# Patient Record
Sex: FEMALE | Race: WHITE | NOT HISPANIC OR LATINO | Employment: FULL TIME | URBAN - METROPOLITAN AREA
[De-identification: names, ages, dates, MRNs, and addresses within clinical notes are randomized per-mention and may not be internally consistent; named-entity substitution may affect disease eponyms.]

---

## 2023-02-20 ENCOUNTER — HOSPITAL ENCOUNTER (EMERGENCY)
Facility: CLINIC | Age: 68
Discharge: HOME | End: 2023-02-20
Attending: FAMILY MEDICINE | Admitting: FAMILY MEDICINE
Payer: COMMERCIAL

## 2023-02-20 ENCOUNTER — APPOINTMENT (OUTPATIENT)
Dept: GENERAL RADIOLOGY | Facility: CLINIC | Age: 68
End: 2023-02-20
Attending: EMERGENCY MEDICINE
Payer: COMMERCIAL

## 2023-02-20 ENCOUNTER — APPOINTMENT (OUTPATIENT)
Dept: GENERAL RADIOLOGY | Facility: CLINIC | Age: 68
End: 2023-02-20
Attending: FAMILY MEDICINE
Payer: COMMERCIAL

## 2023-02-20 VITALS
RESPIRATION RATE: 15 BRPM | OXYGEN SATURATION: 100 % | SYSTOLIC BLOOD PRESSURE: 163 MMHG | WEIGHT: 165 LBS | DIASTOLIC BLOOD PRESSURE: 86 MMHG | HEART RATE: 75 BPM | TEMPERATURE: 97 F

## 2023-02-20 DIAGNOSIS — S43.005A SHOULDER DISLOCATION, LEFT, INITIAL ENCOUNTER: ICD-10-CM

## 2023-02-20 PROCEDURE — 99152 MOD SED SAME PHYS/QHP 5/>YRS: CPT | Mod: LT | Performed by: FAMILY MEDICINE

## 2023-02-20 PROCEDURE — 999N000157 HC STATISTIC RCP TIME EA 10 MIN

## 2023-02-20 PROCEDURE — 23655 CLTX SHO DSLC W/MNPJ W/ANES: CPT | Mod: LT | Performed by: FAMILY MEDICINE

## 2023-02-20 PROCEDURE — 73060 X-RAY EXAM OF HUMERUS: CPT | Mod: LT

## 2023-02-20 PROCEDURE — 250N000011 HC RX IP 250 OP 636: Performed by: FAMILY MEDICINE

## 2023-02-20 PROCEDURE — 29105 APPLICATION LONG ARM SPLINT: CPT | Mod: 59 | Performed by: FAMILY MEDICINE

## 2023-02-20 PROCEDURE — 99152 MOD SED SAME PHYS/QHP 5/>YRS: CPT | Performed by: FAMILY MEDICINE

## 2023-02-20 PROCEDURE — 999N000065 XR SHOULDER LEFT G/E 3 VIEWS: Mod: LT

## 2023-02-20 PROCEDURE — 99285 EMERGENCY DEPT VISIT HI MDM: CPT | Mod: 25 | Performed by: FAMILY MEDICINE

## 2023-02-20 PROCEDURE — 96374 THER/PROPH/DIAG INJ IV PUSH: CPT | Performed by: FAMILY MEDICINE

## 2023-02-20 PROCEDURE — 29105 APPLICATION LONG ARM SPLINT: CPT | Mod: 59,LT | Performed by: FAMILY MEDICINE

## 2023-02-20 PROCEDURE — 23655 CLTX SHO DSLC W/MNPJ W/ANES: CPT | Performed by: FAMILY MEDICINE

## 2023-02-20 RX ORDER — PROPOFOL 10 MG/ML
1 INJECTION, EMULSION INTRAVENOUS ONCE
Status: COMPLETED | OUTPATIENT
Start: 2023-02-20 | End: 2023-02-20

## 2023-02-20 RX ORDER — OXYCODONE HYDROCHLORIDE 5 MG/1
5 TABLET ORAL EVERY 4 HOURS PRN
Qty: 12 TABLET | Refills: 0 | Status: SHIPPED | OUTPATIENT
Start: 2023-02-20 | End: 2023-02-23

## 2023-02-20 RX ORDER — FLUMAZENIL 0.1 MG/ML
0.2 INJECTION, SOLUTION INTRAVENOUS
Status: DISCONTINUED | OUTPATIENT
Start: 2023-02-20 | End: 2023-02-20 | Stop reason: HOSPADM

## 2023-02-20 RX ADMIN — PROPOFOL 75 MG: 10 INJECTION, EMULSION INTRAVENOUS at 17:57

## 2023-02-20 ASSESSMENT — ACTIVITIES OF DAILY LIVING (ADL): ADLS_ACUITY_SCORE: 35

## 2023-02-20 NOTE — ED PROVIDER NOTES
History     Chief Complaint   Patient presents with     Arm Pain     HPI  Na Damon is a 67 year old female who presents to the ED this afternoon with left shoulder pain.  She slipped and fell on the ice and injured her left shoulder.  Denies any other injuries.  Did not strike her head.  Hurts to move the arm and denies any obvious deformity.  No distal numbness.  Has not taken anything for the pain.  Last oral intake was about 6-1/2 hours ago.  Has tolerated sedation in the past without difficulty.      Allergies:  No Known Allergies    Problem List:    There are no problems to display for this patient.       Past Medical History:    No past medical history on file.    Past Surgical History:    Past Surgical History:   Procedure Laterality Date     COLONOSCOPY  06/25/08    Polyps.        Family History:    No family history on file.    Social History:  Marital Status:   [2]        Medications:    oxyCODONE (ROXICODONE) 5 MG tablet  aspirin 81 MG tablet  multivitamin, therapeutic with minerals (MULTI-VITAMIN) TABS          Review of Systems   All other systems reviewed and are negative.      Physical Exam   BP: (!) 143/98  Pulse: 77  Temp: 97  F (36.1  C)  Resp: 18  Weight: 74.8 kg (165 lb)  SpO2: 100 %      Physical Exam  Constitutional:       General: She is in acute distress (moderate).      Appearance: Normal appearance.   HENT:      Head: Normocephalic and atraumatic.      Mouth/Throat:      Mouth: Mucous membranes are moist.      Pharynx: Oropharynx is clear.   Eyes:      Extraocular Movements: Extraocular movements intact.   Cardiovascular:      Rate and Rhythm: Normal rate and regular rhythm.   Pulmonary:      Effort: Pulmonary effort is normal.      Breath sounds: Normal breath sounds.   Musculoskeletal:      Left shoulder: Deformity and tenderness present. Decreased range of motion.      Left elbow: Normal.      Left wrist: Normal.   Neurological:      Mental Status: She is alert.          ED Course                 [facility]    -Dislocation - Upper Extremity    Date/Time: 2/20/2023 7:20 PM  Performed by: Jake Rodriguez MD  Authorized by: Jake Rodriguez MD     Risks, benefits and alternatives discussed.    ED EVALUATION:      I have performed an Emergency Department Evaluation including taking a history and physical examination, this evaluation will be documented in the electronic medical record for this ED encounter.      ASA Class: Class 1- healthy patient    Mallampati: Grade 3- soft palate visible, posterior pharyngeal wall not visible    UNIVERSAL PROTOCOL   Site Marked: NA  Prior Images Obtained and Reviewed:  Yes  Required items: Required blood products, implants, devices and special equipment available    Patient identity confirmed:  Verbally with patient, arm band and provided demographic data  Patient was reevaluated immediately before administering moderate or deep sedation or anesthesia  Confirmation Checklist:  Patient's identity using two indicators, relevant allergies and procedure was appropriate and matched the consent or emergent situation  Time out: Immediately prior to the procedure a time out was called    Universal Protocol: the Joint Commission Universal Protocol was followed    Preparation: Patient was prepped and draped in usual sterile fashion      LOCATION     Location:  Shoulder    Shoulder location:  L shoulder    Shoulder dislocation type: anterior      Chronicity:  New    PRE PROCEDURE ASSESSMENT      Pre-procedure imaging:  X-ray    Imaging findings: dislocation present      Fracture of greater humeral tuberosity: no      Hill-Sachs deformity: no      Distal perfusion: normal      SEDATION  Patient Sedated: Yes    Sedation:  Propofol and see MAR for details  Vital signs: Vital signs monitored during sedation      ANESTHESIA (see MAR for exact dosages)      Anesthesia method:  None    PROCEDURE DETAILS       Manipulation performed: yes      Shoulder reduction method:  Direct traction    Reduction successful: yes      Reduction confirmed with imaging: yes      Immobilization: Shoulder immobilizer.    POST PROCEDURE DETAILS     Neurological function: normal      Distal perfusion: normal      Range of motion: improved        PROCEDURE    Patient Tolerance:  Patient tolerated the procedure well with no immediate complications  Length of time physician/provider present for 1:1 monitoring during sedation: 10                Critical Care time:  none               Results for orders placed or performed during the hospital encounter of 02/20/23 (from the past 24 hour(s))   Humerus XR,  G/E 2 views, left    Narrative    XR HUMERUS LEFT G/E 2 VIEWS 2/20/2023 3:45 PM     HISTORY: fall; pain    COMPARISON: None.       Impression    IMPRESSION: Anterior dislocation of the left glenohumeral joint. No  evidence for fracture but repeat films following reduction  recommended.    KATHIA SLOAN MD         SYSTEM ID:  [id number]   XR Shoulder Left G/E 3 Views    Narrative    EXAM: XR SHOULDER LEFT G/E 3 VIEWS  LOCATION: [facility]  DATE/TIME: 2/20/2023 6:17 PM    INDICATION: post reduction  COMPARISON: 02/20/2023      Impression    IMPRESSION: Interval reduction of previous glenohumeral joint dislocation with normal alignment. No acute fracture.       Medications   flumazenil (ROMAZICON) injection 0.2 mg (has no administration in time range)   propofol (DIPRIVAN) injection 10 mg/mL vial (75 mg Intravenous Given 2/20/23 0418)       Assessments & Plan (with Medical Decision Making)  67-year-old female slipped and fell on the ice and injured her left shoulder.  X-ray shows an anterior dislocation.  No other injuries.   she was sedated with propofol and the shoulder was easily reduced with traction.  She was placed in a shoulder immobilizer.  Postreduction x-ray shows normal anatomical alignment and no  fractures.  Oxycodone for pain.  Ice liberally.  Recheck in orthopedic clinic later this week or next.  We discussed reportable signs when to return.  Verbal and written discharge instructions given.  She and her  are comfortable with this plan.     I have reviewed the nursing notes.    I have reviewed the findings, diagnosis, plan and need for follow up with the patient.           Medical Decision Making  The patient's presentation was of moderate complexity (an acute complicated injury).    The patient's evaluation involved:  ordering and/or review of 1 test(s) in this encounter (see separate area of note for details)    The patient's management necessitated moderate risk (prescription drug management including medications given in the ED) and high risk (a decision regarding emergency major procedure (dislocation reduction)).        New Prescriptions    OXYCODONE (ROXICODONE) 5 MG TABLET    Take 1 tablet (5 mg) by mouth every 4 hours as needed for pain       Final diagnoses:   Shoulder dislocation, left, initial encounter       2/20/2023   [facility] EMERGENCY DEPT     Jake Rodriguez MD  02/20/23 1921

## 2023-02-21 NOTE — DISCHARGE INSTRUCTIONS
Ice 20 minutes per hour, (20 minutes on, 40 minutes off) at least 4 times a day.  Ibuprofen 600 mg and Tylenol 1000 mg every 6 hours as needed for pain.  You can take them at the same time.  Take with food so the Ibuprofen doesn't upset your stomach.  You can use the oxycodone as needed for more severe pain.  Wear the shoulder immobilizer until you are seen in the clinic for reevaluation.  It was a pleasure visiting with both of you tonight.  I hope this heals up quickly for you.    Thank you for choosing [facility]. We appreciate the opportunity to meet your urgent medical needs. Please let us know if we could have done anything to make your stay more satisfying.    After discharge, please closely monitor for any new or worsening symptoms. Return to the Emergency Department if you develop any acute worsening signs or symptoms.    If you had lab work, cultures or imaging studies done during your stay, the final results may still be pending. We will call you if your plan of care needs to change. However, if you are not improving as expected, please follow up with your primary care provider or clinic.     Start any prescription medications that were prescribed to you and take them as directed.     Please see additional handouts that may be pertinent to your condition.

## 2023-02-21 NOTE — TELEPHONE ENCOUNTER
DIAGNOSIS: Shoulder dislocation, left    APPOINTMENT DATE: 02/23/2023   NOTES STATUS DETAILS   OFFICE NOTE from referring provider N/A    OFFICE NOTE from other specialist N/A    DISCHARGE SUMMARY from hospital N/A    DISCHARGE REPORT from the ER Internal 02/20/2023 [facility] ED    OPERATIVE REPORT N/A    EMG report N/A    MEDICATION LIST N/A    MRI N/A    DEXA (osteoporosis/bone health) N/A    CT SCAN N/A    XRAYS (IMAGES & REPORTS) Internal 02/20/2023 [facility] shoulder, humerus

## 2023-02-23 ENCOUNTER — PRE VISIT (OUTPATIENT)
Dept: ORTHOPEDICS | Facility: CLINIC | Age: 68
End: 2023-02-23

## 2023-02-24 ENCOUNTER — OFFICE VISIT (OUTPATIENT)
Dept: ORTHOPEDICS | Facility: CLINIC | Age: 68
End: 2023-02-24
Attending: FAMILY MEDICINE
Payer: COMMERCIAL

## 2023-02-24 DIAGNOSIS — S43.005A SHOULDER DISLOCATION, LEFT, INITIAL ENCOUNTER: ICD-10-CM

## 2023-02-24 PROCEDURE — 99203 OFFICE O/P NEW LOW 30 MIN: CPT | Performed by: FAMILY MEDICINE

## 2023-02-24 ASSESSMENT — PAIN SCALES - GENERAL: PAINLEVEL: NO PAIN (0)

## 2023-02-24 NOTE — LETTER
2/24/2023         RE: Na Damon  [address] [facility] [address]        Dear Colleague,    Thank you for referring your patient, Na Damon, to the [facility] [place]. Please see a copy of my visit note below.    CHIEF COMPLAINT:  Pain and New Patient of the Left Shoulder       HISTORY OF PRESENT ILLNESS  Ms. Damon is a pleasant 67 year old female who presents to clinic today after a left shoulder dislocation.  Na suffered a fall 4 days ago, after he slipped on the ice.  She felt her shoulder dislocate and was seen at the emergency department shortly after.  She did have a successful relocation in the emergency department.  This is her nondominant arm, this is her first time dislocating her shoulder.  She denies any pain at the moment, no numbness or tingling, no strength issues.        Additional history: as documented    MEDICAL HISTORY  There is no problem list on file for this patient.      Current Outpatient Medications   Medication Sig Dispense Refill     aspirin 81 MG tablet Take 81 mg by mouth daily.       multivitamin w/minerals (THERA-VIT-M) tablet Take 1 tablet by mouth daily.         No Known Allergies    No family history on file.    Additional medical/Social/Surgical histories reviewed in [facility] and updated as appropriate.        PHYSICAL EXAM  General  - normal appearance, in no obvious distress  Musculoskeletal - left shoulder  - inspection: normal bone and joint alignment, no obvious deformity, no scapular winging, no AC step-off  - palpation: no tenderness, normal clavicle, non-tender AC  - ROM: not painful to 30 deg in all planes  - strength: 5/5  strength, 5/5 in all shoulder planes  Neuro  - no sensory or motor deficit, grossly normal coordination, normal muscle tone                 ASSESSMENT & PLAN  Ms. Damon is a 67 year old female who presents to clinic today after a left shoulder dislocation.    I reviewed her x-ray  in the room with her and her , this shows pre and post reduction films with no arthritis and no fracture.    We discussed pathophysiology and treatment strategies for his shoulder dislocation and aftercare, we also discussed recurrence rates and the fact that they do decline in accordance with age.    I am referring her to physical therapy, we also gave her a sling today to use in place of her shoulder immobilizer.  She should follow-up in 3 weeks, if she is doing well at that visit we can consider removing restrictions.    It was a pleasure seeing Na today.    Chet Plaza DO, [facility]  Primary Care Sports Medicine      This note was constructed using Dragon dictation software, please excuse any minor errors in spelling, grammar, or syntax.        Again, thank you for allowing me to participate in the care of your patient.        Sincerely,        Chet Plaza DO

## 2023-02-24 NOTE — PROGRESS NOTES
CHIEF COMPLAINT:  Pain and New Patient of the Left Shoulder       HISTORY OF PRESENT ILLNESS  Ms. Damon is a pleasant 67 year old female who presents to clinic today after a left shoulder dislocation.  Na suffered a fall 4 days ago, after he slipped on the ice.  She felt her shoulder dislocate and was seen at the emergency department shortly after.  She did have a successful relocation in the emergency department.  This is her nondominant arm, this is her first time dislocating her shoulder.  She denies any pain at the moment, no numbness or tingling, no strength issues.        Additional history: as documented    MEDICAL HISTORY  There is no problem list on file for this patient.      Current Outpatient Medications   Medication Sig Dispense Refill     aspirin 81 MG tablet Take 81 mg by mouth daily.       multivitamin w/minerals (THERA-VIT-M) tablet Take 1 tablet by mouth daily.         No Known Allergies    No family history on file.    Additional medical/Social/Surgical histories reviewed in [facility] and updated as appropriate.        PHYSICAL EXAM  General  - normal appearance, in no obvious distress  Musculoskeletal - left shoulder  - inspection: normal bone and joint alignment, no obvious deformity, no scapular winging, no AC step-off  - palpation: no tenderness, normal clavicle, non-tender AC  - ROM: not painful to 30 deg in all planes  - strength: 5/5  strength, 5/5 in all shoulder planes  Neuro  - no sensory or motor deficit, grossly normal coordination, normal muscle tone                 ASSESSMENT & PLAN  Ms. Damon is a 67 year old female who presents to clinic today after a left shoulder dislocation.    I reviewed her x-ray in the room with her and her , this shows pre and post reduction films with no arthritis and no fracture.    We discussed pathophysiology and treatment strategies for his shoulder dislocation and aftercare, we also discussed recurrence rates and the fact that they do  decline in accordance with age.    I am referring her to physical therapy, we also gave her a sling today to use in place of her shoulder immobilizer.  She should follow-up in 3 weeks, if she is doing well at that visit we can consider removing restrictions.    It was a pleasure seeing Na today.    Chet Plaza DO, [facility]  Primary Care Sports Medicine      This note was constructed using Dragon dictation software, please excuse any minor errors in spelling, grammar, or syntax.

## 2023-02-24 NOTE — NURSING NOTE
Chief Complaint   Patient presents with     Left Shoulder - Pain, New Patient       There were no vitals filed for this visit.    There is no height or weight on file to calculate BMI.      GAYLE Carvalho NREMT

## 2023-02-28 ENCOUNTER — THERAPY VISIT (OUTPATIENT)
Dept: PHYSICAL THERAPY | Facility: CLINIC | Age: 68
End: 2023-02-28
Payer: COMMERCIAL

## 2023-02-28 DIAGNOSIS — S43.005A SHOULDER DISLOCATION, LEFT, INITIAL ENCOUNTER: ICD-10-CM

## 2023-02-28 PROCEDURE — 97161 PT EVAL LOW COMPLEX 20 MIN: CPT | Mod: GP | Performed by: PHYSICAL THERAPIST

## 2023-02-28 PROCEDURE — 97110 THERAPEUTIC EXERCISES: CPT | Mod: GP | Performed by: PHYSICAL THERAPIST

## 2023-02-28 NOTE — PROGRESS NOTES
Physical Therapy Initial Evaluation  Subjective:  The history is provided by the patient. No  was used.   Patient Health History  Na Damon being seen for L shoulder dislocation.     Problem began: 2/20/2023.   Problem occurred: Fall   Pain is reported as 0/10 on pain scale.  General health as reported by patient is good.  Pertinent medical history includes: high blood pressure.   Red flags:  None as reported by patient.  Medical allergies: none.   Surgeries include:  None.    Current medications:  High blood pressure medication.    Current occupation is Retired.                     Therapist Generated HPI Evaluation  Problem details: Patient is s/p L shoulder dislocation 2/20/2023 after a fall on ice.  Shoulder was relocated in ED.  Wore and immobilizer for 4 days and is now in a sling.  Has been told to wear the sling for 2 weeks and not move arm above shoulder height.  Received PT orders 2/24/2023..         Type of problem:  Left shoulder.    This is a new condition.  Condition occurred with:  A fall.  Where condition occurred: at home.  Site of Pain: Patient reporting no pain.      Since onset symptoms are gradually improving.  Associated symptoms:  Loss of motion/stiffness. Symptoms are exacerbated by other (reaching)  and relieved by rest.      Barriers include:  None as reported by patient.                        Objective:  Standing Alignment:      Shoulder/upper extremity deviations alignment: pt. wearing sling.                                       Shoulder Evaluation:  ROM:  AROM:    Flexion:  Left:  80    Right:  150  Extension: Left: 50Right: 80  Abduction:  Left: 76   Right:  150      External Rotation:  Left:  25    Right:  80      Elbow Flexion:  Left:  Full    Right:  Full  Elbow Extension:  Left:  Full   Right:  Full    Extension/Internal Rotation:  Left:  T11    Right:  T8                                                     General     ROS    Assessment/Plan:    Patient  is a 67 year old female with left side shoulder complaints.    Patient has the following significant findings with corresponding treatment plan.                Diagnosis 1:  L shoulder dislocation  Decreased ROM/flexibility - therapeutic exercise and home program  Decreased strength - therapeutic exercise, therapeutic activities and home program  Decreased function - therapeutic activities and home program    Therapy Evaluation Codes:   1) History comprised of:   Personal factors that impact the plan of care:      None.    Comorbidity factors that impact the plan of care are:      None.     Medications impacting care: None.  2) Examination of Body Systems comprised of:   Body structures and functions that impact the plan of care:      Shoulder.   Activity limitations that impact the plan of care are:      Reaching.  3) Clinical presentation characteristics are:   Stable/Uncomplicated.  4) Decision-Making    Low complexity using standardized patient assessment instrument and/or measureable assessment of functional outcome.  Cumulative Therapy Evaluation is: Low complexity.    Previous and current functional limitations:  (See Goal Flow Sheet for this information)    Short term and Long term goals: (See Goal Flow Sheet for this information)     Communication ability:  Patient appears to be able to clearly communicate and understand verbal and written communication and follow directions correctly.  Treatment Explanation - The following has been discussed with the patient:   RX ordered/plan of care  Anticipated outcomes  Possible risks and side effects  This patient would benefit from PT intervention to resume normal activities.   Rehab potential is good.    Frequency:  2 X a month, once daily  Duration:  for 3 months  Discharge Plan:  Achieve all LTG.  Independent in home treatment program.  Reach maximal therapeutic benefit.    Please refer to the daily flowsheet for treatment today, total treatment time and time  spent performing 1:1 timed codes.

## 2023-02-28 NOTE — PROGRESS NOTES
[facility]    OUTPATIENT Physical Therapy ORTHOPEDIC EVALUATION  PLAN OF TREATMENT FOR OUTPATIENT REHABILITATION  (COMPLETE FOR INITIAL CLAIMS ONLY)  Patient's Last Name, First Name, M.I.  YOB: 1955  Na Damon    Provider s Name:  [facility]   Medical Record No.  [id number]   Start of Care Date:  02/28/23   Onset Date:   02/20/23   Treatment Diagnosis:  L shoulder dislocation Medical Diagnosis:  Shoulder dislocation, left, initial encounter       Goals:     02/28/23 0500   Body Part   Goals listed below are for L shoulder   Goal #1   Goal #1 reaching   Previous Functional Level No restrictions   Current Functional Level Cannot reach    Performance level greater than shoulder height   STG Target Performance Reach ;to shoulder level   Rationale for dressing;for meal preparation   Due date 04/11/23   LTG Target Performance Reach;overhead   Rationale for independent personal hygiene;for dressing   Due date 05/23/23         Therapy Frequency:  2x/month  Predicted Duration of Therapy Intervention:  3 months    Rosa Flowers, PT                 I CERTIFY THE NEED FOR THESE SERVICES FURNISHED UNDER        THIS PLAN OF TREATMENT AND WHILE UNDER MY CARE     (Physician co-signature of this document indicates review and certification of the therapy plan).                     Certification Date From:  02/28/23   Certification Date To:  05/23/23    Referring Provider:  Chet Plaza    Initial Assessment        See Epic Evaluation SOC Date: 02/28/23

## 2023-03-15 ENCOUNTER — THERAPY VISIT (OUTPATIENT)
Dept: PHYSICAL THERAPY | Facility: CLINIC | Age: 68
End: 2023-03-15
Payer: COMMERCIAL

## 2023-03-15 DIAGNOSIS — S43.005A SHOULDER DISLOCATION, LEFT, INITIAL ENCOUNTER: Primary | ICD-10-CM

## 2023-03-15 PROCEDURE — 97110 THERAPEUTIC EXERCISES: CPT | Mod: GP

## 2023-03-20 ENCOUNTER — OFFICE VISIT (OUTPATIENT)
Dept: ORTHOPEDICS | Facility: CLINIC | Age: 68
End: 2023-03-20
Payer: COMMERCIAL

## 2023-03-20 DIAGNOSIS — S43.005D DISLOCATION OF LEFT SHOULDER JOINT, SUBSEQUENT ENCOUNTER: Primary | ICD-10-CM

## 2023-03-20 PROCEDURE — 99213 OFFICE O/P EST LOW 20 MIN: CPT | Performed by: FAMILY MEDICINE

## 2023-03-20 ASSESSMENT — PAIN SCALES - GENERAL: PAINLEVEL: MODERATE PAIN (5)

## 2023-03-20 NOTE — LETTER
3/20/2023         RE: Na Damon  [address] [facility] [facility] [address]        Dear Colleague,    Thank you for referring your patient, Na Damon, to the [facility] [place]. Please see a copy of my visit note below.    HISTORY OF PRESENT ILLNESS  Ms. Damon is a pleasant 67 year old female following up after a left shoulder dislocation.  Na last saw me about 4 weeks ago after a first-time shoulder dislocation.  She underwent a successful sedated reduction in the emergency department.  I did refer her to physical therapy and gave her a sling.  Na has been steadily improving, she is still using her sling on occasion and still does have some pain at times.     PHYSICAL EXAM  General  - normal appearance, in no obvious distress  Musculoskeletal - left shoulder  - inspection: normal bone and joint alignment  - palpation: normal clavicle, non-tender AC  - ROM:  Painful flexion at 90 deg, painful abduction at 60 deg  - strength: 5/5  strength, 5/5 in all shoulder planes  - special tests:  (-) Speed's  (-) Neer  (-) Hawkin's  (-) subscap lift-off  Neuro  - no sensory or motor deficit, grossly normal coordination, normal muscle tone          ASSESSMENT & PLAN  Ms. Damon is a 67 year old female following up after left shoulder dislocation.    Na does seem to be recovering well.  We discussed continuing physical therapy and removing her sling today.  She is going to continue her PT exercises over the course of the next 6 to 8 weeks.  If she is not feeling any better, or if she is still experiencing significant range of motion issues and lateral upper arm pain we should follow-up or I may consider an MRI at that point.    It was a pleasure seeing Na.        Chet Plaza DO, [facility]      This note was constructed using Dragon dictation software, please excuse any minor errors in spelling, grammar, or syntax.        Again, thank you for allowing me  to participate in the care of your patient.        Sincerely,        Chet Plaza, DO

## 2023-03-20 NOTE — NURSING NOTE
Chief Complaint   Patient presents with     Left Shoulder - Pain, Follow Up       There were no vitals filed for this visit.    There is no height or weight on file to calculate BMI.      GAYLE Carvalho NREMT

## 2023-03-20 NOTE — PROGRESS NOTES
HISTORY OF PRESENT ILLNESS  Ms. Damon is a pleasant 67 year old female following up after a left shoulder dislocation.  Na last saw me about 4 weeks ago after a first-time shoulder dislocation.  She underwent a successful sedated reduction in the emergency department.  I did refer her to physical therapy and gave her a sling.  Na has been steadily improving, she is still using her sling on occasion and still does have some pain at times.     PHYSICAL EXAM  General  - normal appearance, in no obvious distress  Musculoskeletal - left shoulder  - inspection: normal bone and joint alignment  - palpation: normal clavicle, non-tender AC  - ROM:  Painful flexion at 90 deg, painful abduction at 60 deg  - strength: 5/5  strength, 5/5 in all shoulder planes  - special tests:  (-) Speed's  (-) Neer  (-) Hawkin's  (-) subscap lift-off  Neuro  - no sensory or motor deficit, grossly normal coordination, normal muscle tone          ASSESSMENT & PLAN  Ms. Damon is a 67 year old female following up after left shoulder dislocation.    Na does seem to be recovering well.  We discussed continuing physical therapy and removing her sling today.  She is going to continue her PT exercises over the course of the next 6 to 8 weeks.  If she is not feeling any better, or if she is still experiencing significant range of motion issues and lateral upper arm pain we should follow-up or I may consider an MRI at that point.    It was a pleasure seeing Na.        Chet Plaza DO, [facility]      This note was constructed using Dragon dictation software, please excuse any minor errors in spelling, grammar, or syntax.

## 2023-03-29 ENCOUNTER — THERAPY VISIT (OUTPATIENT)
Dept: PHYSICAL THERAPY | Facility: CLINIC | Age: 68
End: 2023-03-29
Payer: COMMERCIAL

## 2023-03-29 DIAGNOSIS — S43.005A SHOULDER DISLOCATION, LEFT, INITIAL ENCOUNTER: Primary | ICD-10-CM

## 2023-03-29 PROCEDURE — 97110 THERAPEUTIC EXERCISES: CPT | Mod: GP

## 2023-04-12 ENCOUNTER — THERAPY VISIT (OUTPATIENT)
Dept: PHYSICAL THERAPY | Facility: CLINIC | Age: 68
End: 2023-04-12
Payer: COMMERCIAL

## 2023-04-12 DIAGNOSIS — S43.005A SHOULDER DISLOCATION, LEFT, INITIAL ENCOUNTER: Primary | ICD-10-CM

## 2023-04-12 PROCEDURE — 97110 THERAPEUTIC EXERCISES: CPT | Mod: GP

## 2023-04-26 ENCOUNTER — THERAPY VISIT (OUTPATIENT)
Dept: PHYSICAL THERAPY | Facility: CLINIC | Age: 68
End: 2023-04-26
Payer: COMMERCIAL

## 2023-04-26 DIAGNOSIS — S43.005A SHOULDER DISLOCATION, LEFT, INITIAL ENCOUNTER: Primary | ICD-10-CM

## 2023-04-26 PROCEDURE — 97110 THERAPEUTIC EXERCISES: CPT | Mod: GP

## 2023-04-26 NOTE — PROGRESS NOTES
Subjective:  HPI  Physical Exam                    Objective:  System    Physical Exam    General     ROS    Assessment/Plan:    DISCHARGE REPORT    Progress reporting period is from 2/28/2023 to 4/26/2023.       SUBJECTIVE  Subjective: She reports her shoulder is doing better. She can reach farther and perform activities such as yoga with less pain or motion deficit. She states her upper body strength is generally weak, but improved enough to where she can lift, carry, push and pull. She is traveling soon and will not be able to attend therapy.  Despite this, she is conveniently ready to discharge as well.  She says she exercises independently plus her HEP every day and hopes to soon be able to do her yoga exercises independently.    Current Pain level: 0/10.     Initial Pain level: 0/10.   Changes in function:  Yes (See Goal flowsheet attached for changes in current functional level)  Adverse reaction to treatment or activity: None    OBJECTIVE  Changes noted in objective findings:  Yes  Objective: Standing Shoulder AROM: Flexion 160 degrees, Abduction 158 degrees, ER 55 degrees, Behind Low Back T11. Behind Back of Head T3.  Shoulder MMT: 5/5 bilaterally.  SPADI: 13 (7.63%)     ASSESSMENT/PLAN  Updated problem list and treatment plan: Diagnosis 1:  Left Shoulder Pain  Pain -  hot/cold therapy, manual therapy, self management, education, directional preference exercise and home program  Decreased ROM/flexibility - manual therapy, therapeutic exercise, therapeutic activity and home program  Decreased strength - therapeutic exercise, therapeutic activities and home program  STG/LTGs have been met or progress has been made towards goals:  Yes (See Goal flow sheet completed today.)  Assessment of Progress: The patient's condition is improving.  Self Management Plans:  Patient has been instructed in a home treatment program.  Patient is independent in a home treatment program.  Patient  has been instructed in self  management of symptoms.  Patient is independent in self management of symptoms.  I have re-evaluated this patient and find that the nature, scope, duration and intensity of the therapy is appropriate for the medical condition of the patient.  Na continues to require the following intervention to meet STG and LTG's:  PT intervention is no longer required to meet STG/LTG.    Recommendations:  This patient is ready to be discharged from therapy and continue their home treatment program.    Please refer to the daily flowsheet for treatment today, total treatment time and time spent performing 1:1 timed codes.